# Patient Record
Sex: MALE | Race: BLACK OR AFRICAN AMERICAN | NOT HISPANIC OR LATINO | URBAN - METROPOLITAN AREA
[De-identification: names, ages, dates, MRNs, and addresses within clinical notes are randomized per-mention and may not be internally consistent; named-entity substitution may affect disease eponyms.]

---

## 2020-01-21 ENCOUNTER — INPATIENT (INPATIENT)
Facility: HOSPITAL | Age: 75
LOS: 1 days | Discharge: HOME | DRG: 190 | End: 2020-01-23
Attending: INTERNAL MEDICINE | Admitting: INTERNAL MEDICINE
Payer: MEDICARE

## 2020-01-21 VITALS
DIASTOLIC BLOOD PRESSURE: 81 MMHG | HEIGHT: 73 IN | RESPIRATION RATE: 17 BRPM | SYSTOLIC BLOOD PRESSURE: 136 MMHG | WEIGHT: 220.02 LBS | OXYGEN SATURATION: 93 % | TEMPERATURE: 97 F | HEART RATE: 104 BPM

## 2020-01-21 LAB
ALBUMIN SERPL ELPH-MCNC: 3.9 G/DL — SIGNIFICANT CHANGE UP (ref 3.4–5)
ALP SERPL-CCNC: 70 U/L — SIGNIFICANT CHANGE UP (ref 40–120)
ALT FLD-CCNC: 40 U/L — SIGNIFICANT CHANGE UP (ref 12–42)
ANION GAP SERPL CALC-SCNC: 10 MMOL/L — SIGNIFICANT CHANGE UP (ref 9–16)
ANION GAP SERPL CALC-SCNC: 9 MMOL/L — SIGNIFICANT CHANGE UP (ref 9–16)
APPEARANCE UR: CLEAR — SIGNIFICANT CHANGE UP
AST SERPL-CCNC: 39 U/L — HIGH (ref 15–37)
BASOPHILS # BLD AUTO: 0.01 K/UL — SIGNIFICANT CHANGE UP (ref 0–0.2)
BASOPHILS # BLD AUTO: 0.03 K/UL — SIGNIFICANT CHANGE UP (ref 0–0.2)
BASOPHILS NFR BLD AUTO: 0.2 % — SIGNIFICANT CHANGE UP (ref 0–2)
BASOPHILS NFR BLD AUTO: 0.4 % — SIGNIFICANT CHANGE UP (ref 0–2)
BILIRUB SERPL-MCNC: 0.5 MG/DL — SIGNIFICANT CHANGE UP (ref 0.2–1.2)
BILIRUB UR-MCNC: NEGATIVE — SIGNIFICANT CHANGE UP
BUN SERPL-MCNC: 20 MG/DL — SIGNIFICANT CHANGE UP (ref 7–23)
BUN SERPL-MCNC: 24 MG/DL — HIGH (ref 7–23)
CALCIUM SERPL-MCNC: 8.2 MG/DL — LOW (ref 8.5–10.5)
CALCIUM SERPL-MCNC: 9.9 MG/DL — SIGNIFICANT CHANGE UP (ref 8.5–10.5)
CHLORIDE SERPL-SCNC: 100 MMOL/L — SIGNIFICANT CHANGE UP (ref 96–108)
CHLORIDE SERPL-SCNC: 106 MMOL/L — SIGNIFICANT CHANGE UP (ref 96–108)
CO2 SERPL-SCNC: 26 MMOL/L — SIGNIFICANT CHANGE UP (ref 22–31)
CO2 SERPL-SCNC: 30 MMOL/L — SIGNIFICANT CHANGE UP (ref 22–31)
COLOR SPEC: YELLOW — SIGNIFICANT CHANGE UP
CREAT SERPL-MCNC: 1.32 MG/DL — HIGH (ref 0.5–1.3)
CREAT SERPL-MCNC: 1.96 MG/DL — HIGH (ref 0.5–1.3)
D DIMER BLD IA.RAPID-MCNC: 602 NG/ML DDU — HIGH
DIFF PNL FLD: NEGATIVE — SIGNIFICANT CHANGE UP
EOSINOPHIL # BLD AUTO: 0 K/UL — SIGNIFICANT CHANGE UP (ref 0–0.5)
EOSINOPHIL # BLD AUTO: 0.02 K/UL — SIGNIFICANT CHANGE UP (ref 0–0.5)
EOSINOPHIL NFR BLD AUTO: 0 % — SIGNIFICANT CHANGE UP (ref 0–6)
EOSINOPHIL NFR BLD AUTO: 0.2 % — SIGNIFICANT CHANGE UP (ref 0–6)
FLU A RESULT: DETECTED
FLU A RESULT: DETECTED
FLUAV AG NPH QL: DETECTED
FLUBV AG NPH QL: SIGNIFICANT CHANGE UP
GLUCOSE SERPL-MCNC: 107 MG/DL — HIGH (ref 70–99)
GLUCOSE SERPL-MCNC: 144 MG/DL — HIGH (ref 70–99)
GLUCOSE UR QL: NEGATIVE — SIGNIFICANT CHANGE UP
HCT VFR BLD CALC: 37.1 % — LOW (ref 39–50)
HCT VFR BLD CALC: 42.7 % — SIGNIFICANT CHANGE UP (ref 39–50)
HGB BLD-MCNC: 12.5 G/DL — LOW (ref 13–17)
HGB BLD-MCNC: 13.9 G/DL — SIGNIFICANT CHANGE UP (ref 13–17)
IMM GRANULOCYTES NFR BLD AUTO: 0.4 % — SIGNIFICANT CHANGE UP (ref 0–1.5)
IMM GRANULOCYTES NFR BLD AUTO: 0.5 % — SIGNIFICANT CHANGE UP (ref 0–1.5)
KETONES UR-MCNC: NEGATIVE — SIGNIFICANT CHANGE UP
LEUKOCYTE ESTERASE UR-ACNC: NEGATIVE — SIGNIFICANT CHANGE UP
LYMPHOCYTES # BLD AUTO: 0.53 K/UL — LOW (ref 1–3.3)
LYMPHOCYTES # BLD AUTO: 0.75 K/UL — LOW (ref 1–3.3)
LYMPHOCYTES # BLD AUTO: 8.9 % — LOW (ref 13–44)
LYMPHOCYTES # BLD AUTO: 9.1 % — LOW (ref 13–44)
MCHC RBC-ENTMCNC: 30.2 PG — SIGNIFICANT CHANGE UP (ref 27–34)
MCHC RBC-ENTMCNC: 31 PG — SIGNIFICANT CHANGE UP (ref 27–34)
MCHC RBC-ENTMCNC: 32.6 GM/DL — SIGNIFICANT CHANGE UP (ref 32–36)
MCHC RBC-ENTMCNC: 33.7 GM/DL — SIGNIFICANT CHANGE UP (ref 32–36)
MCV RBC AUTO: 92.1 FL — SIGNIFICANT CHANGE UP (ref 80–100)
MCV RBC AUTO: 92.6 FL — SIGNIFICANT CHANGE UP (ref 80–100)
MONOCYTES # BLD AUTO: 0.21 K/UL — SIGNIFICANT CHANGE UP (ref 0–0.9)
MONOCYTES # BLD AUTO: 0.82 K/UL — SIGNIFICANT CHANGE UP (ref 0–0.9)
MONOCYTES NFR BLD AUTO: 10 % — SIGNIFICANT CHANGE UP (ref 2–14)
MONOCYTES NFR BLD AUTO: 3.5 % — SIGNIFICANT CHANGE UP (ref 2–14)
NEUTROPHILS # BLD AUTO: 5.19 K/UL — SIGNIFICANT CHANGE UP (ref 1.8–7.4)
NEUTROPHILS # BLD AUTO: 6.55 K/UL — SIGNIFICANT CHANGE UP (ref 1.8–7.4)
NEUTROPHILS NFR BLD AUTO: 79.9 % — HIGH (ref 43–77)
NEUTROPHILS NFR BLD AUTO: 86.9 % — HIGH (ref 43–77)
NITRITE UR-MCNC: NEGATIVE — SIGNIFICANT CHANGE UP
NRBC # BLD: 0 /100 WBCS — SIGNIFICANT CHANGE UP (ref 0–0)
NRBC # BLD: 0 /100 WBCS — SIGNIFICANT CHANGE UP (ref 0–0)
NT-PROBNP SERPL-SCNC: 426 PG/ML — HIGH
PCO2 BLDV: 45 MMHG — SIGNIFICANT CHANGE UP (ref 41–51)
PH BLDV: 7.36 — SIGNIFICANT CHANGE UP (ref 7.32–7.43)
PH UR: 7 — SIGNIFICANT CHANGE UP (ref 5–8)
PLATELET # BLD AUTO: 303 K/UL — SIGNIFICANT CHANGE UP (ref 150–400)
PLATELET # BLD AUTO: 349 K/UL — SIGNIFICANT CHANGE UP (ref 150–400)
PO2 BLDV: 43 MMHG — HIGH (ref 35–40)
POTASSIUM SERPL-MCNC: 3.3 MMOL/L — LOW (ref 3.5–5.3)
POTASSIUM SERPL-MCNC: 3.3 MMOL/L — LOW (ref 3.5–5.3)
POTASSIUM SERPL-SCNC: 3.3 MMOL/L — LOW (ref 3.5–5.3)
POTASSIUM SERPL-SCNC: 3.3 MMOL/L — LOW (ref 3.5–5.3)
PROT SERPL-MCNC: 9.1 G/DL — HIGH (ref 6.4–8.2)
PROT UR-MCNC: ABNORMAL MG/DL
RBC # BLD: 4.03 M/UL — LOW (ref 4.2–5.8)
RBC # BLD: 4.61 M/UL — SIGNIFICANT CHANGE UP (ref 4.2–5.8)
RBC # FLD: 15.6 % — HIGH (ref 10.3–14.5)
RBC # FLD: 15.7 % — HIGH (ref 10.3–14.5)
RSV RESULT: SIGNIFICANT CHANGE UP
RSV RNA RESP QL NAA+PROBE: SIGNIFICANT CHANGE UP
SAO2 % BLDV: 73 % — SIGNIFICANT CHANGE UP
SODIUM SERPL-SCNC: 139 MMOL/L — SIGNIFICANT CHANGE UP (ref 132–145)
SODIUM SERPL-SCNC: 142 MMOL/L — SIGNIFICANT CHANGE UP (ref 132–145)
SP GR SPEC: 1.01 — SIGNIFICANT CHANGE UP (ref 1–1.03)
TROPONIN I SERPL-MCNC: 0.04 NG/ML — SIGNIFICANT CHANGE UP (ref 0.02–0.06)
TROPONIN I SERPL-MCNC: 0.04 NG/ML — SIGNIFICANT CHANGE UP (ref 0.02–0.06)
UROBILINOGEN FLD QL: 0.2 E.U./DL — SIGNIFICANT CHANGE UP
WBC # BLD: 5.97 K/UL — SIGNIFICANT CHANGE UP (ref 3.8–10.5)
WBC # BLD: 8.2 K/UL — SIGNIFICANT CHANGE UP (ref 3.8–10.5)
WBC # FLD AUTO: 5.97 K/UL — SIGNIFICANT CHANGE UP (ref 3.8–10.5)
WBC # FLD AUTO: 8.2 K/UL — SIGNIFICANT CHANGE UP (ref 3.8–10.5)

## 2020-01-21 PROCEDURE — 93010 ELECTROCARDIOGRAM REPORT: CPT

## 2020-01-21 PROCEDURE — 99218: CPT

## 2020-01-21 PROCEDURE — 71045 X-RAY EXAM CHEST 1 VIEW: CPT | Mod: 26

## 2020-01-21 RX ORDER — SODIUM CHLORIDE 9 MG/ML
1000 INJECTION INTRAMUSCULAR; INTRAVENOUS; SUBCUTANEOUS ONCE
Refills: 0 | Status: COMPLETED | OUTPATIENT
Start: 2020-01-21 | End: 2020-01-21

## 2020-01-21 RX ORDER — ACETAMINOPHEN 500 MG
975 TABLET ORAL ONCE
Refills: 0 | Status: COMPLETED | OUTPATIENT
Start: 2020-01-21 | End: 2020-01-21

## 2020-01-21 RX ORDER — AZITHROMYCIN 500 MG/1
500 TABLET, FILM COATED ORAL ONCE
Refills: 0 | Status: COMPLETED | OUTPATIENT
Start: 2020-01-21 | End: 2020-01-21

## 2020-01-21 RX ORDER — POTASSIUM CHLORIDE 20 MEQ
40 PACKET (EA) ORAL ONCE
Refills: 0 | Status: COMPLETED | OUTPATIENT
Start: 2020-01-21 | End: 2020-01-21

## 2020-01-21 RX ORDER — IPRATROPIUM/ALBUTEROL SULFATE 18-103MCG
3 AEROSOL WITH ADAPTER (GRAM) INHALATION
Refills: 0 | Status: COMPLETED | OUTPATIENT
Start: 2020-01-21 | End: 2020-01-21

## 2020-01-21 RX ORDER — IPRATROPIUM/ALBUTEROL SULFATE 18-103MCG
3 AEROSOL WITH ADAPTER (GRAM) INHALATION ONCE
Refills: 0 | Status: COMPLETED | OUTPATIENT
Start: 2020-01-21 | End: 2020-01-21

## 2020-01-21 RX ORDER — CEFTRIAXONE 500 MG/1
1000 INJECTION, POWDER, FOR SOLUTION INTRAMUSCULAR; INTRAVENOUS EVERY 24 HOURS
Refills: 0 | Status: COMPLETED | OUTPATIENT
Start: 2020-01-21 | End: 2020-01-21

## 2020-01-21 RX ADMIN — Medication 975 MILLIGRAM(S): at 18:54

## 2020-01-21 RX ADMIN — AZITHROMYCIN 255 MILLIGRAM(S): 500 TABLET, FILM COATED ORAL at 14:11

## 2020-01-21 RX ADMIN — SODIUM CHLORIDE 1000 MILLILITER(S): 9 INJECTION INTRAMUSCULAR; INTRAVENOUS; SUBCUTANEOUS at 22:28

## 2020-01-21 RX ADMIN — SODIUM CHLORIDE 1000 MILLILITER(S): 9 INJECTION INTRAMUSCULAR; INTRAVENOUS; SUBCUTANEOUS at 11:37

## 2020-01-21 RX ADMIN — Medication 3 MILLILITER(S): at 23:50

## 2020-01-21 RX ADMIN — Medication 975 MILLIGRAM(S): at 20:15

## 2020-01-21 RX ADMIN — Medication 40 MILLIEQUIVALENT(S): at 13:02

## 2020-01-21 RX ADMIN — Medication 60 MILLIGRAM(S): at 18:50

## 2020-01-21 RX ADMIN — CEFTRIAXONE 100 MILLIGRAM(S): 500 INJECTION, POWDER, FOR SOLUTION INTRAMUSCULAR; INTRAVENOUS at 13:28

## 2020-01-21 RX ADMIN — SODIUM CHLORIDE 1000 MILLILITER(S): 9 INJECTION INTRAMUSCULAR; INTRAVENOUS; SUBCUTANEOUS at 13:02

## 2020-01-21 RX ADMIN — Medication 3 MILLILITER(S): at 20:20

## 2020-01-21 RX ADMIN — Medication 3 MILLILITER(S): at 18:55

## 2020-01-21 RX ADMIN — Medication 3 MILLILITER(S): at 20:19

## 2020-01-21 RX ADMIN — Medication 30 MILLIGRAM(S): at 14:11

## 2020-01-21 RX ADMIN — Medication 40 MILLIEQUIVALENT(S): at 18:54

## 2020-01-21 NOTE — ED PROVIDER NOTE - ATTENDING CONTRIBUTION TO CARE
Patient presenting with fever, cough and syncope. VS febrile, low'ashtyn Sats, Faint scattered wheezes and coarse breath sound at bases. Looks ill, non toxic. Sepsis arzate initiated. Found to have flu and PNA. Covered with Tamiflu, CTX and Azithro and placed on obs.

## 2020-01-21 NOTE — ED CDU PROVIDER INITIAL DAY NOTE - PMH
DM (diabetes mellitus)    Emphysema lung    HLD (hyperlipidemia)    HTN (hypertension)    Prostate CA    Psychiatric disorder

## 2020-01-21 NOTE — ED ADULT TRIAGE NOTE - CHIEF COMPLAINT QUOTE
Pt BIBA for c/o near syncopal episode while having a bowel movement - per EMS in new onset A-Fib on scene, denies SOB or chest pain

## 2020-01-21 NOTE — ED PROVIDER NOTE - CHPI ED SYMPTOMS NEG
no vomiting/no change in level of consciousness/no body aches, no headache, no chest pain, no shortness of breath

## 2020-01-21 NOTE — ED PROVIDER NOTE - PROGRESS NOTE DETAILS
Intermittently tachycardic to 120s-130s on monitor. Wave form consistent with sinus tachy. No cp, palpitations, shortness of breath during these episodes. Will attempt to recapture on EKG. pt noted to be flu + with possible infiltrate and mildly increased cr. will obs pt for meds, repeat trop, symptomatic improvement. pt's d-dimer noted to be mildly elevated but given the other diagnoses, these could account for his elevated d-dimer.

## 2020-01-21 NOTE — ED CDU PROVIDER INITIAL DAY NOTE - CHPI ED SYMPTOMS NEG
no body aches, no headache, no chest pain, no shortness of breath/no change in level of consciousness/no vomiting

## 2020-01-21 NOTE — ED CDU PROVIDER INITIAL DAY NOTE - PROGRESS NOTE DETAILS
pt re-evaluated, now with some wheezing. will give nebs. pt desat with walking to 89%. would feel safer staying until the AM for repeat abx and symptomatic improvement. pt with persistent R sided wheezing on exam, satting around 94% on 3L currently, attempted to go to bathroom earlier but noted generalized weakness requiring nursing assistance. Ambulated to bathroom and noted SpO2 of 90% on 2L with minimal exertion, s/p serial nebs and still with supplemental O2 requirement.  Amendable to admission to [facility] for influenza/CAP.

## 2020-01-21 NOTE — ED PROVIDER NOTE - CLINICAL SUMMARY MEDICAL DECISION MAKING FREE TEXT BOX
75 y/o M with hx of emphysema, DM, HTN, prostate CA, HLD, psych hx presents to the ED s/p near syncope during BM today with associated lightheadedness and weakness. Pt also endorses nausea with decreased PO intake, productive cough, and body sweats. On O2 in ED. Exam significant for rhonchi at b/l bases, no other significant findings. Will order IV fluids, CXR, labs, and repeat EKG due to sinus tachy to 120s-130s on monitor.

## 2020-01-21 NOTE — ED CDU PROVIDER INITIAL DAY NOTE - ATTENDING CONTRIBUTION TO CARE
Patient presenting with fever, cough and syncope. VS febrile, low'ashtyn Sats, Faint scattered wheezes and coarse breath sound at bases. Looks ill, non toxic. Sepsis arzate initiated. Found to have flu and PNA. Covered with Tamiflu, CTX and Azithro and placed on obs.     Got some neb (had dx bronchitis in the past). Would desat to 93% on RA. Will keep overnight given sx/PNA/flu. Anticipate am d/c unless he takes a turn for the worse. So far HD stable.

## 2020-01-21 NOTE — ED CDU PROVIDER INITIAL DAY NOTE - MEDICAL DECISION MAKING DETAILS
pt presents c/o near syncope pt presents c/o near syncope in the setting of a BM. also describes cough and sweating with lightheadedness. pt noted to be flu positive with some post viral pna on cxr. given azithro and ceftriaxone along with tamiflu. pt noted to have some wheezing, given duonebs. pt noted with occasional desat with exertion. will obs overnight for nebs, repeat IV abx, and symptomatic improvement.

## 2020-01-21 NOTE — ED CDU PROVIDER INITIAL DAY NOTE - OBJECTIVE STATEMENT
75 y/o M with hx of emphysema, DM, HTN, prostate CA, HLD, psych hx presents to the ED s/p near syncope during BM today with associated lightheadedness and weakness. He found it difficult to stand up. Pt also endorses productive cough and sweats x 2days. Productive cough with dark and white mucus, took Robitussin today with no relief. Endorses nausea with poor PO intake. Not on oxygen at home but on O2 in ED due to low O2 sat. Denies LOC, chest pain, shortness of breath congestion, body aches, vomiting, headache.

## 2020-01-21 NOTE — ED PROVIDER NOTE - CONSTITUTIONAL, MLM
normal... Well appearing, awake, alert, oriented to person, place, time/situation and in no apparent distress. Meals and Snack

## 2020-01-22 DIAGNOSIS — Z91.89 OTHER SPECIFIED PERSONAL RISK FACTORS, NOT ELSEWHERE CLASSIFIED: ICD-10-CM

## 2020-01-22 DIAGNOSIS — J43.9 EMPHYSEMA, UNSPECIFIED: ICD-10-CM

## 2020-01-22 DIAGNOSIS — I10 ESSENTIAL (PRIMARY) HYPERTENSION: ICD-10-CM

## 2020-01-22 DIAGNOSIS — R63.8 OTHER SYMPTOMS AND SIGNS CONCERNING FOOD AND FLUID INTAKE: ICD-10-CM

## 2020-01-22 DIAGNOSIS — J10.1 INFLUENZA DUE TO OTHER IDENTIFIED INFLUENZA VIRUS WITH OTHER RESPIRATORY MANIFESTATIONS: ICD-10-CM

## 2020-01-22 DIAGNOSIS — E78.5 HYPERLIPIDEMIA, UNSPECIFIED: ICD-10-CM

## 2020-01-22 DIAGNOSIS — C61 MALIGNANT NEOPLASM OF PROSTATE: ICD-10-CM

## 2020-01-22 DIAGNOSIS — J11.00 INFLUENZA DUE TO UNIDENTIFIED INFLUENZA VIRUS WITH UNSPECIFIED TYPE OF PNEUMONIA: ICD-10-CM

## 2020-01-22 DIAGNOSIS — E11.9 TYPE 2 DIABETES MELLITUS WITHOUT COMPLICATIONS: ICD-10-CM

## 2020-01-22 LAB
ALBUMIN SERPL ELPH-MCNC: 4.1 G/DL — SIGNIFICANT CHANGE UP (ref 3.3–5)
ALP SERPL-CCNC: 54 U/L — SIGNIFICANT CHANGE UP (ref 40–120)
ALT FLD-CCNC: 26 U/L — SIGNIFICANT CHANGE UP (ref 10–45)
ANION GAP SERPL CALC-SCNC: 15 MMOL/L — SIGNIFICANT CHANGE UP (ref 5–17)
ANION GAP SERPL CALC-SCNC: 5 MMOL/L — LOW (ref 9–16)
AST SERPL-CCNC: 40 U/L — SIGNIFICANT CHANGE UP (ref 10–40)
BILIRUB SERPL-MCNC: 0.2 MG/DL — SIGNIFICANT CHANGE UP (ref 0.2–1.2)
BUN SERPL-MCNC: 15 MG/DL — SIGNIFICANT CHANGE UP (ref 7–23)
BUN SERPL-MCNC: 20 MG/DL — SIGNIFICANT CHANGE UP (ref 7–23)
CALCIUM SERPL-MCNC: 8.8 MG/DL — SIGNIFICANT CHANGE UP (ref 8.5–10.5)
CALCIUM SERPL-MCNC: 9.1 MG/DL — SIGNIFICANT CHANGE UP (ref 8.4–10.5)
CHLORIDE SERPL-SCNC: 105 MMOL/L — SIGNIFICANT CHANGE UP (ref 96–108)
CHLORIDE SERPL-SCNC: 106 MMOL/L — SIGNIFICANT CHANGE UP (ref 96–108)
CO2 SERPL-SCNC: 21 MMOL/L — LOW (ref 22–31)
CO2 SERPL-SCNC: 27 MMOL/L — SIGNIFICANT CHANGE UP (ref 22–31)
CREAT SERPL-MCNC: 0.73 MG/DL — SIGNIFICANT CHANGE UP (ref 0.5–1.3)
CREAT SERPL-MCNC: 1.13 MG/DL — SIGNIFICANT CHANGE UP (ref 0.5–1.3)
CRP SERPL-MCNC: 6.2 MG/DL — HIGH (ref 0–0.9)
GLUCOSE BLDC GLUCOMTR-MCNC: 119 MG/DL — HIGH (ref 70–99)
GLUCOSE SERPL-MCNC: 130 MG/DL — HIGH (ref 70–99)
GLUCOSE SERPL-MCNC: 146 MG/DL — HIGH (ref 70–99)
HCT VFR BLD CALC: 41.4 % — SIGNIFICANT CHANGE UP (ref 39–50)
HGB BLD-MCNC: 13.5 G/DL — SIGNIFICANT CHANGE UP (ref 13–17)
MAGNESIUM SERPL-MCNC: 1.8 MG/DL — SIGNIFICANT CHANGE UP (ref 1.6–2.6)
MCHC RBC-ENTMCNC: 30.8 PG — SIGNIFICANT CHANGE UP (ref 27–34)
MCHC RBC-ENTMCNC: 32.6 GM/DL — SIGNIFICANT CHANGE UP (ref 32–36)
MCV RBC AUTO: 94.3 FL — SIGNIFICANT CHANGE UP (ref 80–100)
PLATELET # BLD AUTO: 208 K/UL — SIGNIFICANT CHANGE UP (ref 150–400)
POTASSIUM SERPL-MCNC: 4.1 MMOL/L — SIGNIFICANT CHANGE UP (ref 3.5–5.3)
POTASSIUM SERPL-MCNC: 4.3 MMOL/L — SIGNIFICANT CHANGE UP (ref 3.5–5.3)
POTASSIUM SERPL-SCNC: 4.1 MMOL/L — SIGNIFICANT CHANGE UP (ref 3.5–5.3)
POTASSIUM SERPL-SCNC: 4.3 MMOL/L — SIGNIFICANT CHANGE UP (ref 3.5–5.3)
PROT SERPL-MCNC: 8.6 G/DL — HIGH (ref 6–8.3)
RBC # BLD: 4.39 M/UL — SIGNIFICANT CHANGE UP (ref 4.2–5.8)
RBC # FLD: 15.5 % — HIGH (ref 10.3–14.5)
SODIUM SERPL-SCNC: 138 MMOL/L — SIGNIFICANT CHANGE UP (ref 132–145)
SODIUM SERPL-SCNC: 141 MMOL/L — SIGNIFICANT CHANGE UP (ref 135–145)
WBC # BLD: 7.37 K/UL — SIGNIFICANT CHANGE UP (ref 3.8–10.5)
WBC # FLD AUTO: 7.37 K/UL — SIGNIFICANT CHANGE UP (ref 3.8–10.5)

## 2020-01-22 PROCEDURE — 99223 1ST HOSP IP/OBS HIGH 75: CPT | Mod: GC

## 2020-01-22 PROCEDURE — 99217: CPT

## 2020-01-22 RX ORDER — SODIUM CHLORIDE 9 MG/ML
1000 INJECTION, SOLUTION INTRAVENOUS
Refills: 0 | Status: DISCONTINUED | OUTPATIENT
Start: 2020-01-22 | End: 2020-01-23

## 2020-01-22 RX ORDER — TAMSULOSIN HYDROCHLORIDE 0.4 MG/1
0.4 CAPSULE ORAL AT BEDTIME
Refills: 0 | Status: DISCONTINUED | OUTPATIENT
Start: 2020-01-22 | End: 2020-01-23

## 2020-01-22 RX ORDER — DEXTROSE 50 % IN WATER 50 %
15 SYRINGE (ML) INTRAVENOUS ONCE
Refills: 0 | Status: DISCONTINUED | OUTPATIENT
Start: 2020-01-22 | End: 2020-01-23

## 2020-01-22 RX ORDER — DEXTROSE 50 % IN WATER 50 %
12.5 SYRINGE (ML) INTRAVENOUS ONCE
Refills: 0 | Status: DISCONTINUED | OUTPATIENT
Start: 2020-01-22 | End: 2020-01-23

## 2020-01-22 RX ORDER — CEFTRIAXONE 500 MG/1
1000 INJECTION, POWDER, FOR SOLUTION INTRAMUSCULAR; INTRAVENOUS EVERY 24 HOURS
Refills: 0 | Status: DISCONTINUED | OUTPATIENT
Start: 2020-01-22 | End: 2020-01-23

## 2020-01-22 RX ORDER — INSULIN LISPRO 100/ML
VIAL (ML) SUBCUTANEOUS
Refills: 0 | Status: DISCONTINUED | OUTPATIENT
Start: 2020-01-22 | End: 2020-01-23

## 2020-01-22 RX ORDER — AZITHROMYCIN 500 MG/1
500 TABLET, FILM COATED ORAL EVERY 24 HOURS
Refills: 0 | Status: DISCONTINUED | OUTPATIENT
Start: 2020-01-22 | End: 2020-01-23

## 2020-01-22 RX ORDER — GLUCAGON INJECTION, SOLUTION 0.5 MG/.1ML
1 INJECTION, SOLUTION SUBCUTANEOUS ONCE
Refills: 0 | Status: DISCONTINUED | OUTPATIENT
Start: 2020-01-22 | End: 2020-01-23

## 2020-01-22 RX ORDER — DEXTROSE MONOHYDRATE, SODIUM CHLORIDE, AND POTASSIUM CHLORIDE 50; .745; 4.5 G/1000ML; G/1000ML; G/1000ML
1000 INJECTION, SOLUTION INTRAVENOUS
Refills: 0 | Status: DISCONTINUED | OUTPATIENT
Start: 2020-01-22 | End: 2020-01-23

## 2020-01-22 RX ORDER — IPRATROPIUM/ALBUTEROL SULFATE 18-103MCG
3 AEROSOL WITH ADAPTER (GRAM) INHALATION EVERY 6 HOURS
Refills: 0 | Status: DISCONTINUED | OUTPATIENT
Start: 2020-01-22 | End: 2020-01-23

## 2020-01-22 RX ORDER — ENOXAPARIN SODIUM 100 MG/ML
40 INJECTION SUBCUTANEOUS EVERY 24 HOURS
Refills: 0 | Status: DISCONTINUED | OUTPATIENT
Start: 2020-01-22 | End: 2020-01-23

## 2020-01-22 RX ADMIN — Medication 3 MILLILITER(S): at 22:52

## 2020-01-22 RX ADMIN — CEFTRIAXONE 100 MILLIGRAM(S): 500 INJECTION, POWDER, FOR SOLUTION INTRAMUSCULAR; INTRAVENOUS at 13:32

## 2020-01-22 RX ADMIN — AZITHROMYCIN 255 MILLIGRAM(S): 500 TABLET, FILM COATED ORAL at 13:58

## 2020-01-22 RX ADMIN — Medication 3 MILLILITER(S): at 04:32

## 2020-01-22 RX ADMIN — DEXTROSE MONOHYDRATE, SODIUM CHLORIDE, AND POTASSIUM CHLORIDE 75 MILLILITER(S): 50; .745; 4.5 INJECTION, SOLUTION INTRAVENOUS at 02:07

## 2020-01-22 RX ADMIN — Medication 40 MILLIGRAM(S): at 05:01

## 2020-01-22 RX ADMIN — Medication 3 MILLILITER(S): at 19:23

## 2020-01-22 RX ADMIN — Medication 75 MILLIGRAM(S): at 01:08

## 2020-01-22 RX ADMIN — Medication 3 MILLILITER(S): at 10:30

## 2020-01-22 RX ADMIN — Medication 75 MILLIGRAM(S): at 15:01

## 2020-01-22 RX ADMIN — TAMSULOSIN HYDROCHLORIDE 0.4 MILLIGRAM(S): 0.4 CAPSULE ORAL at 23:19

## 2020-01-22 RX ADMIN — DEXTROSE MONOHYDRATE, SODIUM CHLORIDE, AND POTASSIUM CHLORIDE 75 MILLILITER(S): 50; .745; 4.5 INJECTION, SOLUTION INTRAVENOUS at 19:48

## 2020-01-22 RX ADMIN — ENOXAPARIN SODIUM 40 MILLIGRAM(S): 100 INJECTION SUBCUTANEOUS at 22:52

## 2020-01-22 RX ADMIN — Medication 100 MILLIGRAM(S): at 22:52

## 2020-01-22 RX ADMIN — Medication 75 MILLIGRAM(S): at 23:41

## 2020-01-22 NOTE — H&P ADULT - HISTORY OF PRESENT ILLNESS
75 y/o M with hx of emphysema, DM, HTN, prostate CA 2003, HLD, presents to the ED after a near syncope during BM. Pt states he started experiencing cough with brown sputum, runny nose, shotness of breath starting Saturday. He also experienced subjective fevers and chills. He was overall feeling weaker. Yesterday, while in the bathroom, he felt lightheaded and was unable to stand up from the toilet seat so his friend called EMS. 73 y/o M with hx of emphysema, DM, HTN, prostate CA 2003, HLD, presents to the ED after a near syncope during BM. Pt states he started experiencing cough with brown sputum, runny nose, shortness of breath starting Saturday. He also experienced subjective fevers and chills. He was overall feeling weaker. Yesterday, while in the bathroom, he felt lightheaded and was unable to stand up from the toilet seat so his friend called EMS.    in ER, pt afebrile, , BP stable. Episodes of tachypnea but saturated >90% on RA.. no leukocytosis. Resolution of AGATHA with IVF.1.96 ->1.13. CRP 6.2. trops negative x2. d dimer 600s. Influenza +. Xray chest with possible infiltrates.   given Tamiflu/Cef/Azithro/ duonebs x 4, 3L NS,  Prednisone 60

## 2020-01-22 NOTE — ED ADULT NURSE REASSESSMENT NOTE - NS ED NURSE REASSESS COMMENT FT1
Patient remains in RM 14 sleeping comfortably in stretcher. Denies any acute pain or distress at this time. Nebs administered as per provider order. VS within normal limits. Patient is alert awake and responsive. Speaks slowly at baseline, good historian. Safety maintained. Will continue to monitor. Of note: saturates at 90% RA but after coughing and clearing throat increases to 98%. Denies any jessica or sob.
patient is in no acute pain or distress at this time. repeat blood test collected and sent to lab. pt is for possible admission.
Took report from MARQUIS Card at 1000 for pt. Pt alert and oriented and speaking in full and complete sentences. Rhonchi noted bilateral lung fields. Duoneb given, pending IV ABX at 1300. Will continue to monitor.
Received patient from MARQUIS Diaz. Currently resting in stretcher, in nad, respirations even bilaterally. Given PO steroid and breathing treatment, VSS, awaiting bed for transfer to [facility]. Denies sob, cp fevers, chills.

## 2020-01-22 NOTE — H&P ADULT - PROBLEM SELECTOR PLAN 9
1) PCP Contacted on Admission: (Y/N) --> Name & Phone #:  2) Date of Contact with PCP:  3) PCP Contacted at Discharge: (Y/N)  4) Summary of Handoff Given to PCP:   5) Post-Discharge Appointment Date and Location: [facility] diet    Lovenox  Full code

## 2020-01-22 NOTE — H&P ADULT - PROBLEM SELECTOR PLAN 8
[facility] diet    Lovenox  Full code diagnosed in 2003 s/p radiation in 2004 and seed implants   - follows with urology for monitoring   c/w flomax

## 2020-01-22 NOTE — ED CDU PROVIDER DISPOSITION NOTE - CLINICAL COURSE
75 y/o M with hx of emphysema, DM, HTN, prostate CA, HLD, psych hx presents to the ED s/p near syncope during BM today with associated lightheadedness and weakness. He found it difficult to stand up. Pt also endorses productive cough and sweats x 2days. Productive cough with dark and white mucus, took Robitussin today with no relief. Endorses nausea with poor PO intake. Not on oxygen at home but on O2 in ED due to low O2 sat. noted to be Flu A positive with bibasilar infiltrates, R>L and hypoxic to 89-92 with 2-3L supplementation O2.  Placed in obs for serial nebs and IV abx/meds with persistent hypoxia.  Desat to 88% on ambulation and noted generalized weakness with decreased po intake.  Needing assistance at bedside.  Given comorbidities, hypoxic with supplemental O2 requirements, will admit to [facility] for further management. Case discussed with hospitalist, Dr. Sánchez, accepted under Dr. Reyna

## 2020-01-22 NOTE — H&P ADULT - ASSESSMENT
73 y/o M with hx of emphysema, DM, HTN, prostate CA 2003, HLD, presents to the ED after a near syncope during BM found to be positive for Influenza with possible underlying infiltrates

## 2020-01-22 NOTE — H&P ADULT - NSHPLABSRESULTS_GEN_ALL_CORE
.  LABS:                         12.5   5.97  )-----------( 303      ( 2020 23:50 )             37.1         138  |  106  |  20  ----------------------------<  146<H>  4.1   |  27  |  1.13    Ca    8.8      2020 23:50    TPro  9.1<H>  /  Alb  3.9  /  TBili  0.5  /  DBili  x   /  AST  39<H>  /  ALT  40  /  AlkPhos  70        Urinalysis Basic - ( 2020 22:24 )    Color: Yellow / Appearance: Clear / S.010 / pH: x  Gluc: x / Ketone: NEGATIVE  / Bili: NEGATIVE / Urobili: 0.2 E.U./dL   Blood: x / Protein: Trace mg/dL / Nitrite: NEGATIVE   Leuk Esterase: NEGATIVE / RBC: < 5 /HPF / WBC < 5 /HPF   Sq Epi: x / Non Sq Epi: 0-5 /HPF / Bacteria: Present /HPF      CARDIAC MARKERS ( 2020 15:53 )  0.039 ng/mL / x     / x     / x     / x      CARDIAC MARKERS ( 2020 11:42 )  0.036 ng/mL / x     / x     / x     / x                RADIOLOGY, EKG & ADDITIONAL TESTS: Reviewed.

## 2020-01-22 NOTE — ED CDU PROVIDER DISPOSITION NOTE - ATTENDING CONTRIBUTION TO CARE
agree with disposition and management. patient with comorbidities, requiring admission and transfer to [facility]

## 2020-01-22 NOTE — H&P ADULT - PROBLEM SELECTOR PLAN 4
diagnosed in 2003 s/p radiation in 2004 and seed implants   - follows with urology for monitoring   c/w flomax resolved s/p IVFs,  monitor renal fxn

## 2020-01-22 NOTE — H&P ADULT - PROBLEM SELECTOR PLAN 3
Obtain med rec for opt inhalers   -c/w Cirilo  - Given wheezing, will continue with Prednisone 40 daily

## 2020-01-22 NOTE — H&P ADULT - NSHPPHYSICALEXAM_GEN_ALL_CORE
.  VITAL SIGNS:  T(C): 36.3 (01-22-20 @ 21:37), Max: 37.2 (01-22-20 @ 01:09)  T(F): 97.3 (01-22-20 @ 21:37), Max: 98.9 (01-22-20 @ 01:09)  HR: 67 (01-22-20 @ 21:37) (64 - 114)  BP: 161/64 (01-22-20 @ 21:37) (138/76 - 170/140)  BP(mean): 115 (01-22-20 @ 10:17) (94 - 115)  RR: 20 (01-22-20 @ 21:37) (16 - 20)  SpO2: 94% (01-22-20 @ 21:37) (92% - 100%)  Wt(kg): --    PHYSICAL EXAM:    Constitutional: WDWN resting comfortably in bed; NAD  Head: NC/AT  Eyes: PERRL, EOMI, clear conjunctiva  ENT: no nasal discharge; uvula midline, no oropharyngeal erythema or exudates; MMM  Neck: supple; no JVD or thyromegaly  Respiratory: good air entry. diffuse wheezing. no accessory muscle use.   Cardiac: +S1/S2; RRR; no M/R/G; PMI non-displaced  Gastrointestinal: soft, NT/ND; no rebound or guarding; +BSx4  Back: spine midline, no bony tenderness or step-offs; no CVAT B/L  Extremities: WWP, no clubbing or cyanosis; no peripheral edema  Musculoskeletal: NROM x4; no joint swelling, tenderness or erythema  Vascular: 2+ radial, femoral, DP/PT pulses B/L  Dermatologic: skin warm, dry and intact; no rashes, wounds, or scars  Lymphatic: no submandibular or cervical LAD  Neurologic: AAOx3; CNII-XII grossly intact; no focal deficits  Psychiatric: affect and characteristics of appearance, verbalizations, behaviors are appropriate

## 2020-01-22 NOTE — H&P ADULT - ATTENDING COMMENTS
patient seen and examined a/f flu, PNA, COPD exacerbation  reviewed pertinent data, h&p  PE  findings as above, except pt w/ b/l rhonchi on anterior and posterior auscultation, w/ R basilar crackle    a/p:   1. FLU/ PNA/ COPD exacerbation: on tamiflu/ ceftriaxone/ azithromycin; c/w prednisone 40mg daily and duonebs; monitor glucose, in setting of DM, may need basal/ bolus regimen in addition to ISS.     rest of plan as above

## 2020-01-22 NOTE — H&P ADULT - NSICDXPASTMEDICALHX_GEN_ALL_CORE_FT
PAST MEDICAL HISTORY:  DM (diabetes mellitus)     Emphysema lung     HLD (hyperlipidemia)     HTN (hypertension)     Prostate CA     Psychiatric disorder

## 2020-01-22 NOTE — H&P ADULT - PROBLEM SELECTOR PLAN 2
- possible b/l infiltrates on Xray  - c/w Cef/Azithro.  - f/u procalcitonin  - f/u repeat Xray chest - possible b/l infiltrates on Xray  - c/w Cef/Azithro.  - f/u procalcitonin  - f/u repeat Xray chest  - d-dimer elevated likely 2/2  Sepsis and pt with improvement with abx treatment. would hold off further PE workup for now

## 2020-01-23 VITALS
DIASTOLIC BLOOD PRESSURE: 80 MMHG | RESPIRATION RATE: 18 BRPM | HEART RATE: 76 BPM | TEMPERATURE: 97 F | OXYGEN SATURATION: 97 % | SYSTOLIC BLOOD PRESSURE: 142 MMHG

## 2020-01-23 DIAGNOSIS — J44.1 CHRONIC OBSTRUCTIVE PULMONARY DISEASE WITH (ACUTE) EXACERBATION: ICD-10-CM

## 2020-01-23 DIAGNOSIS — N17.9 ACUTE KIDNEY FAILURE, UNSPECIFIED: ICD-10-CM

## 2020-01-23 LAB
ANION GAP SERPL CALC-SCNC: 18 MMOL/L — HIGH (ref 5–17)
BASOPHILS # BLD AUTO: 0.01 K/UL — SIGNIFICANT CHANGE UP (ref 0–0.2)
BASOPHILS NFR BLD AUTO: 0.1 % — SIGNIFICANT CHANGE UP (ref 0–2)
BUN SERPL-MCNC: 15 MG/DL — SIGNIFICANT CHANGE UP (ref 7–23)
CALCIUM SERPL-MCNC: 9.2 MG/DL — SIGNIFICANT CHANGE UP (ref 8.4–10.5)
CHLORIDE SERPL-SCNC: 106 MMOL/L — SIGNIFICANT CHANGE UP (ref 96–108)
CO2 SERPL-SCNC: 19 MMOL/L — LOW (ref 22–31)
CREAT SERPL-MCNC: 0.82 MG/DL — SIGNIFICANT CHANGE UP (ref 0.5–1.3)
CULTURE RESULTS: NO GROWTH — SIGNIFICANT CHANGE UP
EOSINOPHIL # BLD AUTO: 0.01 K/UL — SIGNIFICANT CHANGE UP (ref 0–0.5)
EOSINOPHIL NFR BLD AUTO: 0.1 % — SIGNIFICANT CHANGE UP (ref 0–6)
GLUCOSE BLDC GLUCOMTR-MCNC: 112 MG/DL — HIGH (ref 70–99)
GLUCOSE BLDC GLUCOMTR-MCNC: 153 MG/DL — HIGH (ref 70–99)
GLUCOSE SERPL-MCNC: 122 MG/DL — HIGH (ref 70–99)
IMM GRANULOCYTES NFR BLD AUTO: 0.7 % — SIGNIFICANT CHANGE UP (ref 0–1.5)
LYMPHOCYTES # BLD AUTO: 1.01 K/UL — SIGNIFICANT CHANGE UP (ref 1–3.3)
LYMPHOCYTES # BLD AUTO: 13.7 % — SIGNIFICANT CHANGE UP (ref 13–44)
MONOCYTES # BLD AUTO: 0.68 K/UL — SIGNIFICANT CHANGE UP (ref 0–0.9)
MONOCYTES NFR BLD AUTO: 9.2 % — SIGNIFICANT CHANGE UP (ref 2–14)
NEUTROPHILS # BLD AUTO: 5.61 K/UL — SIGNIFICANT CHANGE UP (ref 1.8–7.4)
NEUTROPHILS NFR BLD AUTO: 76.2 % — SIGNIFICANT CHANGE UP (ref 43–77)
NRBC # BLD: 0 /100 WBCS — SIGNIFICANT CHANGE UP (ref 0–0)
POTASSIUM SERPL-MCNC: 3.9 MMOL/L — SIGNIFICANT CHANGE UP (ref 3.5–5.3)
POTASSIUM SERPL-SCNC: 3.9 MMOL/L — SIGNIFICANT CHANGE UP (ref 3.5–5.3)
PROCALCITONIN SERPL-MCNC: 0.21 NG/ML — HIGH (ref 0.02–0.1)
PROCALCITONIN SERPL-MCNC: 0.22 NG/ML — HIGH (ref 0.02–0.1)
SODIUM SERPL-SCNC: 143 MMOL/L — SIGNIFICANT CHANGE UP (ref 135–145)
SPECIMEN SOURCE: SIGNIFICANT CHANGE UP

## 2020-01-23 PROCEDURE — 82962 GLUCOSE BLOOD TEST: CPT

## 2020-01-23 PROCEDURE — 84145 PROCALCITONIN (PCT): CPT

## 2020-01-23 PROCEDURE — 94640 AIRWAY INHALATION TREATMENT: CPT

## 2020-01-23 PROCEDURE — 86140 C-REACTIVE PROTEIN: CPT

## 2020-01-23 PROCEDURE — 96375 TX/PRO/DX INJ NEW DRUG ADDON: CPT

## 2020-01-23 PROCEDURE — 82803 BLOOD GASES ANY COMBINATION: CPT

## 2020-01-23 PROCEDURE — 96374 THER/PROPH/DIAG INJ IV PUSH: CPT

## 2020-01-23 PROCEDURE — 87631 RESP VIRUS 3-5 TARGETS: CPT

## 2020-01-23 PROCEDURE — 71045 X-RAY EXAM CHEST 1 VIEW: CPT

## 2020-01-23 PROCEDURE — 83735 ASSAY OF MAGNESIUM: CPT

## 2020-01-23 PROCEDURE — 85025 COMPLETE CBC W/AUTO DIFF WBC: CPT

## 2020-01-23 PROCEDURE — 85379 FIBRIN DEGRADATION QUANT: CPT

## 2020-01-23 PROCEDURE — 84484 ASSAY OF TROPONIN QUANT: CPT

## 2020-01-23 PROCEDURE — 93005 ELECTROCARDIOGRAM TRACING: CPT

## 2020-01-23 PROCEDURE — G0378: CPT

## 2020-01-23 PROCEDURE — 83880 ASSAY OF NATRIURETIC PEPTIDE: CPT

## 2020-01-23 PROCEDURE — 99239 HOSP IP/OBS DSCHRG MGMT >30: CPT | Mod: GC

## 2020-01-23 PROCEDURE — 80053 COMPREHEN METABOLIC PANEL: CPT

## 2020-01-23 PROCEDURE — 81001 URINALYSIS AUTO W/SCOPE: CPT

## 2020-01-23 PROCEDURE — 97161 PT EVAL LOW COMPLEX 20 MIN: CPT

## 2020-01-23 PROCEDURE — 80048 BASIC METABOLIC PNL TOTAL CA: CPT

## 2020-01-23 PROCEDURE — 36415 COLL VENOUS BLD VENIPUNCTURE: CPT

## 2020-01-23 PROCEDURE — 99285 EMERGENCY DEPT VISIT HI MDM: CPT | Mod: 25

## 2020-01-23 PROCEDURE — 87086 URINE CULTURE/COLONY COUNT: CPT

## 2020-01-23 RX ORDER — TAMSULOSIN HYDROCHLORIDE 0.4 MG/1
1 CAPSULE ORAL
Qty: 0 | Refills: 0 | DISCHARGE

## 2020-01-23 RX ORDER — LEVOTHYROXINE SODIUM 125 MCG
1 TABLET ORAL
Qty: 0 | Refills: 0 | DISCHARGE

## 2020-01-23 RX ORDER — OLANZAPINE 15 MG/1
1 TABLET, FILM COATED ORAL
Qty: 0 | Refills: 0 | DISCHARGE

## 2020-01-23 RX ORDER — AZITHROMYCIN 500 MG/1
1 TABLET, FILM COATED ORAL
Qty: 1 | Refills: 0
Start: 2020-01-23 | End: 2020-01-23

## 2020-01-23 RX ORDER — METOPROLOL TARTRATE 50 MG
25 TABLET ORAL DAILY
Refills: 0 | Status: DISCONTINUED | OUTPATIENT
Start: 2020-01-23 | End: 2020-01-23

## 2020-01-23 RX ORDER — CEFPODOXIME PROXETIL 100 MG
1 TABLET ORAL
Qty: 4 | Refills: 0
Start: 2020-01-23 | End: 2020-01-24

## 2020-01-23 RX ORDER — FLUTICASONE PROPIONATE 220 MCG
2 AEROSOL WITH ADAPTER (GRAM) INHALATION
Qty: 0 | Refills: 0 | DISCHARGE

## 2020-01-23 RX ORDER — ASPIRIN/CALCIUM CARB/MAGNESIUM 324 MG
81 TABLET ORAL DAILY
Refills: 0 | Status: DISCONTINUED | OUTPATIENT
Start: 2020-01-23 | End: 2020-01-23

## 2020-01-23 RX ORDER — CHOLECALCIFEROL (VITAMIN D3) 125 MCG
1 CAPSULE ORAL
Qty: 0 | Refills: 0 | DISCHARGE

## 2020-01-23 RX ORDER — ALBUTEROL 90 UG/1
2 AEROSOL, METERED ORAL
Qty: 0 | Refills: 0 | DISCHARGE

## 2020-01-23 RX ORDER — LOSARTAN POTASSIUM 100 MG/1
50 TABLET, FILM COATED ORAL DAILY
Refills: 0 | Status: DISCONTINUED | OUTPATIENT
Start: 2020-01-23 | End: 2020-01-23

## 2020-01-23 RX ORDER — ASPIRIN/CALCIUM CARB/MAGNESIUM 324 MG
1 TABLET ORAL
Qty: 0 | Refills: 0 | DISCHARGE

## 2020-01-23 RX ORDER — LEVOTHYROXINE SODIUM 125 MCG
88 TABLET ORAL DAILY
Refills: 0 | Status: DISCONTINUED | OUTPATIENT
Start: 2020-01-24 | End: 2020-01-23

## 2020-01-23 RX ORDER — ESCITALOPRAM OXALATE 10 MG/1
1 TABLET, FILM COATED ORAL
Qty: 0 | Refills: 0 | DISCHARGE

## 2020-01-23 RX ORDER — LOSARTAN POTASSIUM 100 MG/1
1 TABLET, FILM COATED ORAL
Qty: 0 | Refills: 0 | DISCHARGE

## 2020-01-23 RX ORDER — METFORMIN HYDROCHLORIDE 850 MG/1
1 TABLET ORAL
Qty: 0 | Refills: 0 | DISCHARGE

## 2020-01-23 RX ORDER — TAMSULOSIN HYDROCHLORIDE 0.4 MG/1
0.4 CAPSULE ORAL AT BEDTIME
Refills: 0 | Status: DISCONTINUED | OUTPATIENT
Start: 2020-01-23 | End: 2020-01-23

## 2020-01-23 RX ORDER — ESCITALOPRAM OXALATE 10 MG/1
10 TABLET, FILM COATED ORAL DAILY
Refills: 0 | Status: DISCONTINUED | OUTPATIENT
Start: 2020-01-23 | End: 2020-01-23

## 2020-01-23 RX ORDER — METOPROLOL TARTRATE 50 MG
1 TABLET ORAL
Qty: 0 | Refills: 0 | DISCHARGE

## 2020-01-23 RX ORDER — MAGNESIUM SULFATE 500 MG/ML
1 VIAL (ML) INJECTION ONCE
Refills: 0 | Status: COMPLETED | OUTPATIENT
Start: 2020-01-23 | End: 2020-01-23

## 2020-01-23 RX ORDER — ATORVASTATIN CALCIUM 80 MG/1
40 TABLET, FILM COATED ORAL AT BEDTIME
Refills: 0 | Status: DISCONTINUED | OUTPATIENT
Start: 2020-01-23 | End: 2020-01-23

## 2020-01-23 RX ORDER — HYDROCHLOROTHIAZIDE 25 MG
12.5 TABLET ORAL DAILY
Refills: 0 | Status: DISCONTINUED | OUTPATIENT
Start: 2020-01-23 | End: 2020-01-23

## 2020-01-23 RX ORDER — ATORVASTATIN CALCIUM 80 MG/1
1 TABLET, FILM COATED ORAL
Qty: 0 | Refills: 0 | DISCHARGE

## 2020-01-23 RX ADMIN — Medication 25 MILLIGRAM(S): at 12:00

## 2020-01-23 RX ADMIN — AZITHROMYCIN 255 MILLIGRAM(S): 500 TABLET, FILM COATED ORAL at 13:50

## 2020-01-23 RX ADMIN — CEFTRIAXONE 100 MILLIGRAM(S): 500 INJECTION, POWDER, FOR SOLUTION INTRAMUSCULAR; INTRAVENOUS at 12:00

## 2020-01-23 RX ADMIN — Medication 75 MILLIGRAM(S): at 05:49

## 2020-01-23 RX ADMIN — Medication 81 MILLIGRAM(S): at 11:59

## 2020-01-23 RX ADMIN — Medication 100 GRAM(S): at 03:16

## 2020-01-23 RX ADMIN — Medication 3 MILLILITER(S): at 09:09

## 2020-01-23 RX ADMIN — Medication 40 MILLIGRAM(S): at 05:49

## 2020-01-23 RX ADMIN — Medication 100 MILLIGRAM(S): at 05:49

## 2020-01-23 RX ADMIN — Medication 100 MILLIGRAM(S): at 11:59

## 2020-01-23 RX ADMIN — Medication 3 MILLILITER(S): at 03:16

## 2020-01-23 RX ADMIN — ESCITALOPRAM OXALATE 10 MILLIGRAM(S): 10 TABLET, FILM COATED ORAL at 11:59

## 2020-01-23 RX ADMIN — LOSARTAN POTASSIUM 50 MILLIGRAM(S): 100 TABLET, FILM COATED ORAL at 12:00

## 2020-01-23 RX ADMIN — Medication 2: at 13:50

## 2020-01-23 NOTE — PROGRESS NOTE ADULT - ATTENDING COMMENTS
Doing well, saturating well at RA  Will complete 5 days of tamiflu for Influenza treatment  Will complete 5 days of CAP treatment (zithromax/cefpodoxime)  C/w prednisone for total of 5 days for mild COPD exacerbation, c/w inhalers  AGATHA resolved  Rest as above  Stable for discharge home, PMD f/up as outpatient

## 2020-01-23 NOTE — CONSULT NOTE ADULT - ASSESSMENT
per Internal Medicine    75 y/o M with hx of emphysema, DM, HTN, prostate CA 2003, HLD, presents to the ED after a near syncope during BM found to be positive for Influenza with possible underlying infiltrates     Problem/Plan - 1:  ·  Problem: Influenza A.  Plan: c/w Tamiflu treatment dose.     Problem/Plan - 2:  ·  Problem: Pneumonia and influenza.  Plan: - possible b/l infiltrates on Xray  - c/w Cef/Azithro.  - f/u procalcitonin  - f/u repeat Xray chest  - d-dimer elevated likely 2/2  Sepsis and pt with improvement with abx treatment. would hold off further PE workup for now.     Problem/Plan - 3:  ·  Problem: COPD exacerbation.  Plan: Obtain med rec for opt inhalers   -c/w Duonebs  - Given wheezing, will continue with Prednisone 40 daily.     Problem/Plan - 4:  ·  Problem: AGATHA (acute kidney injury).  Plan: resolved s/p IVFs,  monitor renal fxn.     Problem/Plan - 5:  ·  Problem: DM (diabetes mellitus).  Plan: Obtain med rec for home PO meds   MISS.     Problem/Plan - 6:  Problem: HTN (hypertension). Plan: Obtain med recs for BP meds at home.   Will start on Amlodipine if hypertensive o/n.    Problem/Plan - 7:  ·  Problem: HLD (hyperlipidemia).  Plan: med rec.     Problem/Plan - 8:  ·  Problem: Prostate CA.  Plan: diagnosed in 2003 s/p radiation in 2004 and seed implants   - follows with urology for monitoring   c/w flomax.     Problem/Plan - 9:  ·  Problem: Nutrition, metabolism, and development symptoms.  Plan: [facility] diet    Lovenox  Full code.

## 2020-01-23 NOTE — PHYSICAL THERAPY INITIAL EVALUATION ADULT - ADDITIONAL COMMENTS
Pt lives alone in an apartment~30 steps walk up. Prior to admission, pt ambulated independently without assistive device. Pt has a cane at home. In addition, pt has HHA 3 days x 4 hours.

## 2020-01-23 NOTE — CONSULT NOTE ADULT - SUBJECTIVE AND OBJECTIVE BOX
Patient is a 74y old  Male who presents with a chief complaint of FLU/ PNA / COPD exacerbation (2020 09:08)       HPI:  73 y/o M with hx of emphysema, DM, HTN, prostate CA 2003, HLD, presents to the ED after a near syncope during BM. Pt states he started experiencing cough with brown sputum, runny nose, shortness of breath starting Saturday. He also experienced subjective fevers and chills. He was overall feeling weaker. Yesterday, while in the bathroom, he felt lightheaded and was unable to stand up from the toilet seat so his friend called EMS.    in ER, pt afebrile, , BP stable. Episodes of tachypnea but saturated >90% on RA.. no leukocytosis. Resolution of AGATHA with IVF.1.96 ->1.13. CRP 6.2. trops negative x2. d dimer 600s. Influenza +. Xray chest with possible infiltrates.   given Tamiflu/Cef/Azithro/ duonebs x 4, 3L NS,  Prednisone 60 (2020 22:21)      PAST MEDICAL & SURGICAL HISTORY:  Psychiatric disorder  HTN (hypertension)  HLD (hyperlipidemia)  Prostate CA  Emphysema lung  DM (diabetes mellitus)      MEDICATIONS  (STANDING):  albuterol/ipratropium for Nebulization 3 milliLiter(s) Nebulizer every 6 hours  aspirin  chewable 81 milliGRAM(s) Oral daily  atorvastatin 40 milliGRAM(s) Oral at bedtime  azithromycin  IVPB 500 milliGRAM(s) IV Intermittent every 24 hours  cefTRIAXone   IVPB 1000 milliGRAM(s) IV Intermittent every 24 hours  dextrose 5%. 1000 milliLiter(s) (50 mL/Hr) IV Continuous <Continuous>  dextrose 50% Injectable 12.5 Gram(s) IV Push once  enoxaparin Injectable 40 milliGRAM(s) SubCutaneous every 24 hours  escitalopram 10 milliGRAM(s) Oral daily  hydrochlorothiazide 12.5 milliGRAM(s) Oral daily  insulin lispro (HumaLOG) corrective regimen sliding scale   SubCutaneous Before meals and at bedtime  losartan 50 milliGRAM(s) Oral daily  metoprolol succinate ER 25 milliGRAM(s) Oral daily  oseltamivir 75 milliGRAM(s) Oral two times a day  predniSONE   Tablet 40 milliGRAM(s) Oral daily  tamsulosin 0.4 milliGRAM(s) Oral at bedtime  tamsulosin 0.4 milliGRAM(s) Oral at bedtime    MEDICATIONS  (PRN):  dextrose 40% Gel 15 Gram(s) Oral once PRN Blood Glucose LESS THAN 70 milliGRAM(s)/deciliter  glucagon  Injectable 1 milliGRAM(s) IntraMuscular once PRN Glucose LESS THAN 70 milligrams/deciliter  guaiFENesin   Syrup  (Sugar-Free) 100 milliGRAM(s) Oral every 6 hours PRN Cough      FAMILY HISTORY:  FH: heart disease: father  Family history of sinusitis: mother      CBC Full  -  ( 2020 23:15 )  WBC Count : 7.37 K/uL  RBC Count : 4.39 M/uL  Hemoglobin : 13.5 g/dL  Hematocrit : 41.4 %  Platelet Count - Automated : 208 K/uL  Mean Cell Volume : 94.3 fl  Mean Cell Hemoglobin : 30.8 pg  Mean Cell Hemoglobin Concentration : 32.6 gm/dL  Auto Neutrophil # : 5.61 K/uL  Auto Lymphocyte # : 1.01 K/uL  Auto Monocyte # : 0.68 K/uL  Auto Eosinophil # : 0.01 K/uL  Auto Basophil # : 0.01 K/uL  Auto Neutrophil % : 76.2 %  Auto Lymphocyte % : 13.7 %  Auto Monocyte % : 9.2 %  Auto Eosinophil % : 0.1 %  Auto Basophil % : 0.1 %          143  |  106  |  15  ----------------------------<  122<H>  3.9   |  19<L>  |  0.82    Ca    9.2      2020 07:16  Mg     1.8         TPro  8.6<H>  /  Alb  4.1  /  TBili  0.2  /  DBili  x   /  AST  40  /  ALT  26  /  AlkPhos  54        Urinalysis Basic - ( 2020 22:24 )    Color: Yellow / Appearance: Clear / S.010 / pH: x  Gluc: x / Ketone: NEGATIVE  / Bili: NEGATIVE / Urobili: 0.2 E.U./dL   Blood: x / Protein: Trace mg/dL / Nitrite: NEGATIVE   Leuk Esterase: NEGATIVE / RBC: < 5 /HPF / WBC < 5 /HPF   Sq Epi: x / Non Sq Epi: 0-5 /HPF / Bacteria: Present /HPF          Radiology:    < from: Xray Chest 1 View AP/PA (20 @ 11:33) >    EXAM:  XR CHEST AP OR PA 1V                           PROCEDURE DATE:  2020          INTERPRETATION:    PORTABLE CHEST X-RAY    Indication: rhonchi, cough    No prior study available for comparison.    Findings: Streaky opacities at both lungbases. No pleural effusion. Cardiomediastinal silhouette, bones and soft tissues unremarkable.    Impression: Streaky bibasilar opacities could represent atelectasis or infiltrate.        Vital Signs Last 24 Hrs  T(C): 36.3 (2020 05:50), Max: 37.1 (2020 10:17)  T(F): 97.4 (2020 05:50), Max: 98.7 (2020 10:17)  HR: 89 (2020 05:50) (67 - 114)  BP: 148/84 (2020 05:50) (138/76 - 170/140)  BP(mean): 115 (2020 10:17) (115 - 115)  RR: 18 (2020 05:50) (16 - 20)  SpO2: 96% (2020 05:50) (94% - 100%)    REVIEW OF SYSTEMS:    CONSTITUTIONAL: fatigue  EYES: No eye pain, visual disturbances, or discharge  ENMT:  No difficulty hearing, tinnitus, vertigo; No sinus or throat pain  NECK: No pain or stiffness  BREASTS: No pain, masses, or nipple discharge  RESPIRATORY: No cough, wheezing, chills or hemoptysis; No shortness of breath  CARDIOVASCULAR: No chest pain, palpitations, dizziness, or leg swelling  GASTROINTESTINAL: No abdominal or epigastric pain. No nausea, vomiting, or hematemesis; No diarrhea or constipation. No melena or hematochezia.  GENITOURINARY: No dysuria, frequency, hematuria, or incontinence  NEUROLOGICAL: No headaches, memory loss, loss of strength, numbness, or tremors  SKIN: No itching, burning, rashes, or lesions   LYMPH NODES: No enlarged glands  ENDOCRINE: No heat or cold intolerance; No hair loss  MUSCULOSKELETAL: No joint pain or swelling; No muscle, back, or extremity pain  PSYCHIATRIC: No depression, anxiety, mood swings, or difficulty sleeping  HEME/LYMPH: No easy bruising, or bleeding gums  ALLERGY AND IMMUNOLOGIC: No hives or eczema  VASCULAR: no swelling, erythema        Physical Exam: 75 yo AA gentleman lying in semi Rendon's position, "feeling better"    Head: normocephalic, atraumatic    Eyes: PERRLA, EOMI, no nystagmus, sclera anicteric    ENT: nasal discharge, uvula midline, no oropharyngeal erythema/exudate    Neck: supple, negative JVD, negative carotid bruits, no thyromegaly    Chest:  exp wheezes    Cardiovascular: regular rate and rhythm, neg murmurs/rubs/gallops    Abdomen: soft, non distended, non tender to palpation, negative rebound/guarding, normal bowel sounds, neg hepatosplenomegaly    Extremities: WWP, neg cyanosis/clubbing/edema, negative calf tenderness to palpation, negative Raghav's sign    :     Neurologic Exam:    Alert and oriented to person, place, date/year, speech fluent w/o dysarthria, recent and remote memory intact, repetition intact, comprehension intact    Cranial Nerves:     II:                       pupils equal, round and reactive to light, visual fields intact   III/ IV/VI:            extraocular movements intact, neg nystagmus, neg ptosis  V:                       facial sensation intact, V1-3 normal  VII:                     face symmetric, no droop, normal eye closure and smile  VIII:                    hearing intact to finger rub bilaterally  IX/ X:                 soft palate rise symmetrical  XI:                      head turning, shoulder shrug normal  XII:                     tongue midline    Motor Exam:    Upper Extremities:     RIght:  no focal weakness               negative drift    Left :    no focal weakness               negative drift    Lower Extremities:                 Right:   no focal weakness                 Left:      no focal weakness               Sensory:    intact to LT/PP in all UE/LE dermatomes                     no neglect or extinction on double simultaneous testing                           DTR:            = biceps/     triceps/     brachioradialis                      = patella/   medial hamstring/ankle                      neg clonus                      neg Babinski                        Gait:  not tested        PM&R Impression:    1) deconditioned  2) no focal weakness        Recommendations:    1) Physical therapy focusing on therapeutic exercises, bed mobility/transfer out of bed evaluation, progressive ambulation with assistive devices prn.    2) Disposition Plan/Recs:  pending functional progress

## 2020-01-23 NOTE — PROGRESS NOTE ADULT - PROBLEM SELECTOR PLAN 8
diagnosed in 2003 s/p radiation in 2004 and seed implants   - follows with urology for monitoring   c/w flomax

## 2020-01-23 NOTE — DISCHARGE NOTE PROVIDER - NSDCMRMEDTOKEN_GEN_ALL_CORE_FT
albuterol 90 mcg/inh inhalation powder: 2 puff(s) inhaled 3 times a day  aspirin 81 mg oral tablet: 1 tab(s) orally once a day  atorvastatin 40 mg oral tablet: 1 tab(s) orally once a day (in the evening)  azithromycin 500 mg oral tablet: 1 tab(s) orally once a day  cefpodoxime 200 mg oral tablet: 1 tab(s) orally every 12 hours   escitalopram 10 mg oral tablet: 1 tab(s) orally once a day (at bedtime)  Flovent 110 mcg/inh inhalation aerosol with adapter: 2 puff(s) inhaled 2 times a day  hydroCHLOROthiazide 12.5 mg oral capsule: 1 cap(s) orally once a day  levothyroxine 88 mcg (0.088 mg) oral tablet: 1 tab(s) orally once a day (in the morning)  losartan 50 mg oral tablet: 1 tab(s) orally once a day  metFORMIN 500 mg oral tablet: 1 tab(s) orally once a day  metoprolol succinate 25 mg oral tablet, extended release: 1 tab(s) orally once a day  OLANZapine 10 mg oral tablet: 1 tab(s) orally once a day (in the morning)  oseltamivir 75 mg oral capsule: 1 cap(s) orally 2 times a day  predniSONE 20 mg oral tablet: 2 tab(s) orally once a day  tamsulosin 0.4 mg oral capsule: 1 cap(s) orally once a day (in the evening)  Vitamin D3 50,000 intl units (1250 mcg) oral capsule: 1 cap(s) orally once a week

## 2020-01-23 NOTE — PHYSICAL THERAPY INITIAL EVALUATION ADULT - GAIT DEVIATIONS NOTED, PT EVAL
decreased step length/fairly steady gait, no lose of balance, decreased heel strike and hip flexion bilaterally./decreased denis/increased time in double stance

## 2020-01-23 NOTE — PHYSICAL THERAPY INITIAL EVALUATION ADULT - GENERAL OBSERVATIONS, REHAB EVAL
Pt received OOB sitting in the chair, +hep-lock, NAD. Pt left OOB sitting in the chair, call bell in reach, NAD, RN awares.

## 2020-01-23 NOTE — DISCHARGE NOTE PROVIDER - HOSPITAL COURSE
73 y/o M with hx of emphysema, DM, HTN, prostate CA 2003, HLD, presents to the ED after a near syncope during BM found to be positive for Influenza with possible underlying infiltrates and ?COPD exacerbation.         Problem List/Main Diagnoses (system-based):     1. Influenza A     - positive for influenza    - treated with tamiflu total 5 day course     - s/p 3 L NS bolus    - only meeting 1 SIRS criteria for tachycardia--no sepsis     - no WBC, procalcitonin 0.22, however CRP 6.2        2. CAP     - CXR with possible underlying infiltrates, concern for superimposed pneumonia in setting of flu     - started treatment with CTX and azithromycin     - treatment with CTX 1g (total 5 day course--sent with cefpodoxime 200mg BID x 2 more days)     - treatment with IV azithromycin 500mg (total 3 day course--sent with one more day of azithromycin 500mg po)          3. COPD exacerbation     - pt with hx of emphysema from smoking history years ago (quit 20 years ago)     - uses flovent and albuterol inhalers at home     - saturating 98% on RA while inpatient     - managed with duonebs prn; given wheezing on exam, started prednisone    - however, treated with prednisone 60mg x1 and 40mg for 4 more days (end 1/25)         4. DM     - takes metformin at home     - continued on mSSI         5. HTN     - continued home meds--HCTZ, losartan, Toprol         6. hx of prostate cancer 2003 s/p seeds and radiation     - stable     - continued on tamsulosin 0.4mg (home med)         Inpatient treatment course:         Pt admitted and treated with tamiflu x total 5 days, IV CTX 1g x total 5 days, IV azithromycin 500mg x total 3 days.         New medications:     - cefpodoxime 200mg BID x 2 more days (total 5 days)    - azithromycin 500mg qD x 1 more day (total 3 days)    - tamiflu 75mg BID x 2 more days (total 5 days)    - prednisone 40mg x 2 more days (total 5 days)

## 2020-01-23 NOTE — PROGRESS NOTE ADULT - SUBJECTIVE AND OBJECTIVE BOX
OVERNIGHT EVENTS: NAEO    SUBJECTIVE / INTERVAL HPI: Patient seen and examined at bedside. Pt reports that he feels well, feels much better than previously     VITAL SIGNS:  Vital Signs Last 24 Hrs  T(C): 36.3 (2020 05:50), Max: 37.1 (2020 10:17)  T(F): 97.4 (2020 05:50), Max: 98.7 (2020 10:17)  HR: 89 (2020 05:50) (67 - 114)  BP: 148/84 (2020 05:50) (138/76 - 170/140)  BP(mean): 115 (2020 10:17) (115 - 115)  RR: 18 (2020 05:50) (16 - 20)  SpO2: 96% (2020 05:50) (94% - 100%)    PHYSICAL EXAM:    General: WDWN  HEENT: NC/AT; PERRL, anicteric sclera; MMM  Neck: supple  Cardiovascular: +S1/S2; RRR  Respiratory: CTA B/L; no W/R/R  Gastrointestinal: soft, NT/ND; +BSx4  Extremities: WWP; no edema, clubbing or cyanosis  Vascular: 2+ radial, DP/PT pulses B/L  Neurological: AAOx3; no focal deficits    MEDICATIONS:  MEDICATIONS  (STANDING):  albuterol/ipratropium for Nebulization 3 milliLiter(s) Nebulizer every 6 hours  azithromycin  IVPB 500 milliGRAM(s) IV Intermittent every 24 hours  cefTRIAXone   IVPB 1000 milliGRAM(s) IV Intermittent every 24 hours  dextrose 5%. 1000 milliLiter(s) (50 mL/Hr) IV Continuous <Continuous>  dextrose 50% Injectable 12.5 Gram(s) IV Push once  enoxaparin Injectable 40 milliGRAM(s) SubCutaneous every 24 hours  insulin lispro (HumaLOG) corrective regimen sliding scale   SubCutaneous Before meals and at bedtime  oseltamivir 75 milliGRAM(s) Oral two times a day  predniSONE   Tablet 40 milliGRAM(s) Oral daily  tamsulosin 0.4 milliGRAM(s) Oral at bedtime    MEDICATIONS  (PRN):  dextrose 40% Gel 15 Gram(s) Oral once PRN Blood Glucose LESS THAN 70 milliGRAM(s)/deciliter  glucagon  Injectable 1 milliGRAM(s) IntraMuscular once PRN Glucose LESS THAN 70 milligrams/deciliter  guaiFENesin   Syrup  (Sugar-Free) 100 milliGRAM(s) Oral every 6 hours PRN Cough      ALLERGIES:  Allergies    No Known Allergies    Intolerances        LABS:                        13.5   7.37  )-----------( 208      ( 2020 23:15 )             41.4         143  |  106  |  15  ----------------------------<  122<H>  3.9   |  19<L>  |  0.82    Ca    9.2      2020 07:16  Mg     1.8         TPro  8.6<H>  /  Alb  4.1  /  TBili  0.2  /  DBili  x   /  AST  40  /  ALT  26  /  AlkPhos  54        Urinalysis Basic - ( 2020 22:24 )    Color: Yellow / Appearance: Clear / S.010 / pH: x  Gluc: x / Ketone: NEGATIVE  / Bili: NEGATIVE / Urobili: 0.2 E.U./dL   Blood: x / Protein: Trace mg/dL / Nitrite: NEGATIVE   Leuk Esterase: NEGATIVE / RBC: < 5 /HPF / WBC < 5 /HPF   Sq Epi: x / Non Sq Epi: 0-5 /HPF / Bacteria: Present /HPF      CAPILLARY BLOOD GLUCOSE      POCT Blood Glucose.: 112 mg/dL (2020 08:52)      RADIOLOGY & ADDITIONAL TESTS: Reviewed.

## 2020-01-23 NOTE — PROGRESS NOTE ADULT - PROBLEM SELECTOR PLAN 2
- possible b/l infiltrates on Xray  - c/w Cef/Azithro.  - f/u procalcitonin  - f/u repeat Xray chest  - d-dimer elevated likely 2/2  Sepsis and pt with improvement with abx treatment. would hold off further PE workup for now

## 2020-01-23 NOTE — DISCHARGE NOTE NURSING/CASE MANAGEMENT/SOCIAL WORK - PATIENT PORTAL LINK FT
You can access the FollowMyHealth Patient Portal offered by [facility] by registering at the following website: [email or web address]. By joining [facility]’s FollowMyHealth portal, you will also be able to view your health information using other applications (apps) compatible with our system.

## 2020-01-23 NOTE — PHYSICAL THERAPY INITIAL EVALUATION ADULT - PERTINENT HX OF CURRENT PROBLEM, REHAB EVAL
Pt is a 75 yo male presents to the ED after a near syncope during BM found to be positive for Influenza with possible underlying infiltrates

## 2020-01-23 NOTE — DISCHARGE NOTE PROVIDER - NSDCCPCAREPLAN_GEN_ALL_CORE_FT
PRINCIPAL DISCHARGE DIAGNOSIS  Diagnosis: Influenza A  Assessment and Plan of Treatment: You were admitted for lightheaded and weakness when you were at your friend's house. You were having some shortness of breath at that time as well. You tested positive for influenza A. We gave you some fluids through your IV. The treatment for this is tamiflu 75mg twice a day, which you will start tonight. You will continue this through 1/25, Saturday. Please pick this up at your pharmacy.      SECONDARY DISCHARGE DIAGNOSES  Diagnosis: Pneumonia and influenza  Assessment and Plan of Treatment: On top of the flu, we found you to have a pneumonia on your chest xray. We started you on two different antibiotics and you will need to finish the course for both of these medications.   1. START cefpodoxime 200mg TWICE a day for 2 more days (last day 1/25, Saturday); start taking this 1/24 morning.   2. START azithromycin 500mg once a day for 1 more day (last day 1/24, Friday); start taking this 1/24 tomorrow for one more dose   3. START prednisone 40mg once a day for 2 more days (last day 1/25, Saturday); start taking this 1/24

## 2020-01-30 DIAGNOSIS — Z92.3 PERSONAL HISTORY OF IRRADIATION: ICD-10-CM

## 2020-01-30 DIAGNOSIS — Z85.46 PERSONAL HISTORY OF MALIGNANT NEOPLASM OF PROSTATE: ICD-10-CM

## 2020-01-30 DIAGNOSIS — N17.9 ACUTE KIDNEY FAILURE, UNSPECIFIED: ICD-10-CM

## 2020-01-30 DIAGNOSIS — Z79.899 OTHER LONG TERM (CURRENT) DRUG THERAPY: ICD-10-CM

## 2020-01-30 DIAGNOSIS — E03.9 HYPOTHYROIDISM, UNSPECIFIED: ICD-10-CM

## 2020-01-30 DIAGNOSIS — J10.1 INFLUENZA DUE TO OTHER IDENTIFIED INFLUENZA VIRUS WITH OTHER RESPIRATORY MANIFESTATIONS: ICD-10-CM

## 2020-01-30 DIAGNOSIS — E78.5 HYPERLIPIDEMIA, UNSPECIFIED: ICD-10-CM

## 2020-01-30 DIAGNOSIS — I10 ESSENTIAL (PRIMARY) HYPERTENSION: ICD-10-CM

## 2020-01-30 DIAGNOSIS — E11.9 TYPE 2 DIABETES MELLITUS WITHOUT COMPLICATIONS: ICD-10-CM

## 2020-01-30 DIAGNOSIS — J43.9 EMPHYSEMA, UNSPECIFIED: ICD-10-CM

## 2020-01-30 DIAGNOSIS — J10.00 INFLUENZA DUE TO OTHER IDENTIFIED INFLUENZA VIRUS WITH UNSPECIFIED TYPE OF PNEUMONIA: ICD-10-CM

## 2022-09-28 NOTE — ED PROVIDER NOTE - OBJECTIVE STATEMENT
5 75 y/o M with hx of emphysema, DM, HTN, prostate CA, HLD, psych hx presents to the ED s/p near syncope during BM today with associated lightheadedness and weakness. He found it difficult to stand up. Pt also endorses productive cough and sweats x 2days. Productive cough with dark and white mucus, took Robitussin today with no relief. Endorses nausea with poor PO intake. Not on oxygen at home but on O2 in ED due to low O2 sat. Denies LOC, chest pain, shortness of breath congestion, body aches, vomiting, headache.

## 2023-01-27 NOTE — ED PROVIDER NOTE - PMH
Tucker Chávez with [address] [place] [address] DM (diabetes mellitus)    Emphysema lung    HLD (hyperlipidemia)    HTN (hypertension)    Prostate CA    Psychiatric disorder

## 2023-10-17 NOTE — ED PROVIDER NOTE - ENMT NEGATIVE STATEMENT, MLM
Quality 402: Tobacco Use And Help With Quitting Among Adolescents: Patient screened for tobacco and never smoked
Quality 431: Preventive Care And Screening: Unhealthy Alcohol Use - Screening: Patient not identified as an unhealthy alcohol user when screened for unhealthy alcohol use using a systematic screening method
Detail Level: Detailed
Quality 226: Preventive Care And Screening: Tobacco Use: Screening And Cessation Intervention: Patient screened for tobacco use and is an ex/non-smoker
Ears: no ear pain and no hearing problems.Nose: no nasal congestion and no nasal drainage.Mouth/Throat: no dysphagia, no hoarseness and no throat pain.Neck: no lumps, no pain, no stiffness and no swollen glands.